# Patient Record
Sex: MALE | Race: OTHER | HISPANIC OR LATINO | URBAN - METROPOLITAN AREA
[De-identification: names, ages, dates, MRNs, and addresses within clinical notes are randomized per-mention and may not be internally consistent; named-entity substitution may affect disease eponyms.]

---

## 2020-04-07 ENCOUNTER — EMERGENCY (EMERGENCY)
Facility: HOSPITAL | Age: 70
LOS: 1 days | Discharge: HOME | End: 2020-04-07
Attending: OBSTETRICS & GYNECOLOGY | Admitting: EMERGENCY MEDICINE
Payer: MEDICARE

## 2020-04-07 VITALS
SYSTOLIC BLOOD PRESSURE: 159 MMHG | HEART RATE: 74 BPM | TEMPERATURE: 100 F | OXYGEN SATURATION: 100 % | DIASTOLIC BLOOD PRESSURE: 78 MMHG | RESPIRATION RATE: 18 BRPM

## 2020-04-07 PROCEDURE — 99283 EMERGENCY DEPT VISIT LOW MDM: CPT | Mod: CS

## 2020-04-08 PROCEDURE — 71045 X-RAY EXAM CHEST 1 VIEW: CPT | Mod: 26

## 2020-04-08 PROCEDURE — 99284 EMERGENCY DEPT VISIT MOD MDM: CPT | Mod: CS

## 2020-04-08 RX ORDER — AZITHROMYCIN 500 MG/1
1 TABLET, FILM COATED ORAL
Qty: 6 | Refills: 0
Start: 2020-04-08

## 2020-04-08 RX ORDER — ACETAMINOPHEN 500 MG
975 TABLET ORAL ONCE
Refills: 0 | Status: COMPLETED | OUTPATIENT
Start: 2020-04-08 | End: 2020-04-08

## 2020-04-08 RX ADMIN — Medication 500 MILLIGRAM(S): at 02:02

## 2020-04-08 RX ADMIN — Medication 975 MILLIGRAM(S): at 00:40

## 2020-04-08 NOTE — ED PROVIDER NOTE - PROGRESS NOTE DETAILS
CXR showed viral pneumonia suspicious for Covid. Headache improved after given Tylenol and Naproxen. Pt. states that headaches began since he became febrile 10 days ago. Repeat BP 140s/90s. Neuro exam grossly normal. Pt. denies N/V or vision changes associated with the headaches. CXR showed viral pneumonia suspicious for Covid. Pt. states that headaches began since he became febrile 10 days ago. Repeat BP 140s/90s. Reassured that headache is likely due to Covid infection. Neuro exam grossly normal. Pt. denies N/V or vision changes associated with the headaches.

## 2020-04-08 NOTE — ED PROVIDER NOTE - CLINICAL SUMMARY MEDICAL DECISION MAKING FREE TEXT BOX
70 yo M with suspected Covid based on CXR and symptoms, non-hypoxic. D/c home on Rx Z-maine.   The patient does not have high-risk features of a severe infection (severe shortness of breath, light-headedness, or inability to perform ADLSs) or present with any concerning vital signs or symptoms that would indicate imminent respiratory failure.  I have had a long conversation with the patient regarding the need for return to the ED:  worsening cough, shortness of breath, syncope, near-syncope, or any other concerns. 70 yo M with suspected Covid based on CXR and symptoms, non-hypoxic. D/c home on Rx Z-maine.   The patient does not have high-risk features of a severe infection (severe shortness of breath, light-headedness, or inability to perform ADLSs) or present with any concerning vital signs or symptoms that would indicate imminent respiratory failure.  I have had a long conversation with the patient regarding the need for return to the ED:  worsening cough, shortness of breath, syncope, near-syncope, nausea, emesis, vision changes, or worsening headaches.

## 2020-04-08 NOTE — ED PROVIDER NOTE - OBJECTIVE STATEMENT
Pt. is a 68 yo M who presents with 10-days h/o productive cough, headaches, and fever of 102. He is a non-smoker. States that his daughter and wife have been diagnosed with Covid. Last Tylenol taken at 7  mg. Denies SOB or chest pain. Walking O2 sats 97%. Denies h/o DM, HTN, lungs disease or renal disease.

## 2020-04-08 NOTE — ED PROVIDER NOTE - PMH
Allergy to environmental factors    Chronic back pain, unspecified back location, unspecified back pain laterality    Dyslipidemia

## 2020-04-08 NOTE — ED PROVIDER NOTE - PATIENT PORTAL LINK FT
You can access the FollowMyHealth Patient Portal offered by [facility] [place] by registering at the following website: [email or web address]. By joining [facility]’s FollowMyHealth portal, you will also be able to view your health information using other applications (apps) compatible with our system.

## 2020-04-08 NOTE — ED PROVIDER NOTE - NSFOLLOWUPINSTRUCTIONS_ED_ALL_ED_FT
Your symptoms may be due to the novel coronavirus (COVID19).     For any questions regarding COVID19 or quarantine, changes in your clinical status or any concerns, please call: [facility] at: [phone]    Please return to the ER if you develop worsening or concerning symptoms, shortness of breath, high fevers not improved with antifever medications, difficulty eating or drinking, chest pain, or anything else of concern to you.    You may take 500-1000 mg acetaminophen every 6 hours, as needed for fever / pain    What is a coronavirus?  Coronaviruses are a large family of viruses that cause illnesses ranging from the common cold to more severe diseases such as Middle East Respiratory Syndrome (MERS) and Severe Acute Respiratory Syndrome (SARS).    What is Novel Coronavirus (COVID-19)?  The Centers for Disease Control and Prevention (CDC) is closely monitoring the outbreak caused by COVID-19. For the latest information about COVID-19, visit the CDC website at CDC.gov/Coronavirus    How are coronaviruses spread?  Coronaviruses can be transmitted from person-toperson, usually after close contact with an infected  person (for example, in a household, workplace, or healthcare setting), via droplets that become airborne after a cough or sneeze. These droplets can then infect a nearby person. Transmission can also occur by touching recently contaminated surfaces.    Is there a treatment for a COVID-19?  There is no specific treatment for disease caused by COVID-19. However, many of the symptoms can be treated based on the patient’s clinical condition. Supportive care for infected persons can be highly effective.    What are the symptoms of coronavirus infection?  It depends on the virus, but common signs include fever and/or respiratory symptoms such as cough and shortness of breath. In more severe cases, infection can cause pneumonia, severe acute respiratory syndrome, kidney failure and even death. Fortunately, most cases of COVID-19 have an illness no different than the influenza (flu), with a majority of these patients having mild symptoms and overall mortality which appears to be not much different than the flu.    What can I do to protect myself?  The best precautionary measures:  – washing your hands  – covering your cough  – disinfecting surfaces  – it is also advisable to avoid close contact with anyone showing symptoms of respiratory illness such as coughing and sneezing  – those with symptoms should wear a surgical mask when around others    What can I do to protect those around me?  If you have been identified as someone who may be infected with COVID-19, we recommend you follow the self-isolation procedures outlined on the following page to protect those around you and to limit the spread of this virus.    We recommend the below precautionary steps from now until 14 days from when you returned from your travel or date of your last known possible contact:    — Do not go to work, school or public areas. Avoid using public transportation, ridesharing or taxis.  — As much as possible, separate yourself from other people in your home. If you can, you should stay in a room and away from other people. Also, you should use a separate bathroom if available.  — Wear the supplied mask whenever you are around other people.  — If you have a non-urgent medical appointment, please reschedule for a later date. If the appointment is urgent, please call the health care provider and tell them that you are on self-isolation for possible COVID-19. This will help the health care provider’s office take steps to keep other people from getting infected or exposed. If you can reschedule routine appointments, do so.  — Wash your hands often with soap and water for at least 15 to 20 seconds or clean your hands with an alcohol-based hand  that contains 60 to 95% alcohol, covering all surfaces of your hands and rubbing them together until they feel dry. Soap and water should be used preferentially if hands are visibly dirty.  — Cover your mouth and nose with a tissue when you cough or sneeze. Throw used tissues in a lined trash can. Immediately wash your hands.  — Avoid touching your eyes, nose, and mouth with your hands.  — Avoid sharing personal household items. You should not share dishes, drinking glasses, cups, eating utensils, towels, or bedding with other people or pets in your home. After using these items, they should be washed thoroughly with soap and water.  — Clean and disinfect all “high-touch”surfaces every day. High touch surfaces include counters, tabletops, doorknobs, light switches, remote controls, bathroom fixtures, toilets, phones, keyboards, tablets, and bedside tables. Also, clean any surfaces that may have blood, stool, or body fluids on them.
